# Patient Record
Sex: MALE | Race: WHITE
[De-identification: names, ages, dates, MRNs, and addresses within clinical notes are randomized per-mention and may not be internally consistent; named-entity substitution may affect disease eponyms.]

---

## 2022-02-23 ENCOUNTER — APPOINTMENT (OUTPATIENT)
Dept: ORTHOPEDIC SURGERY | Facility: CLINIC | Age: 32
End: 2022-02-23
Payer: COMMERCIAL

## 2022-02-23 VITALS
WEIGHT: 170 LBS | DIASTOLIC BLOOD PRESSURE: 64 MMHG | HEART RATE: 70 BPM | HEIGHT: 71 IN | SYSTOLIC BLOOD PRESSURE: 110 MMHG | BODY MASS INDEX: 23.8 KG/M2

## 2022-02-23 DIAGNOSIS — M22.2X1 PATELLOFEMORAL DISORDERS, RIGHT KNEE: ICD-10-CM

## 2022-02-23 DIAGNOSIS — M25.569 PAIN IN UNSPECIFIED KNEE: ICD-10-CM

## 2022-02-23 DIAGNOSIS — M22.2X2 PATELLOFEMORAL DISORDERS, RIGHT KNEE: ICD-10-CM

## 2022-02-23 PROBLEM — Z00.00 ENCOUNTER FOR PREVENTIVE HEALTH EXAMINATION: Status: ACTIVE | Noted: 2022-02-23

## 2022-02-23 PROCEDURE — 73562 X-RAY EXAM OF KNEE 3: CPT | Mod: 50

## 2022-02-23 PROCEDURE — 99203 OFFICE O/P NEW LOW 30 MIN: CPT

## 2022-02-23 RX ORDER — MELOXICAM 15 MG/1
15 TABLET ORAL
Qty: 30 | Refills: 0 | Status: ACTIVE | COMMUNITY
Start: 2022-02-23 | End: 1900-01-01

## 2022-02-23 NOTE — DISCUSSION/SUMMARY
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  Recommended conservative treatment at this time.  Is no concerning signs on exam and no concerning signs on his history.  I given prescription for meloxicam 15 mg daily as needed for pain.  Have also recommended physical therapy for anterior knee pain.  All questions were asked and answered.  He will follow up with me in an as-needed basis.

## 2022-02-23 NOTE — CONSULT LETTER
[FreeTextEntry2] : Unable to Collect PCP\par  [FreeTextEntry3] : Yobany Navarro MD\par Orthopaedic Surgery\par Primary/Revision Hip & Knee Orthoplasty\par [facility] [place]\par \par [facility] \par [address] \par [address] \[place] [address]\par \par Tel [phone]\par Fax [phone]\par \par For same day and next day orthopedic appointments contact:\par [email or web address] |[phone] Appointments available nights and weekends!  \par \par [facility] [place]\par Visit us at [facility]/orthopaedic\par

## 2022-02-23 NOTE — REVIEW OF SYSTEMS
[Joint Pain] : joint pain [Joint Swelling] : joint swelling [Negative] : Heme/Lymph [FreeTextEntry9] : left knee

## 2022-02-23 NOTE — PHYSICAL EXAM
[de-identified] : GENERAL APPEARANCE: Well nourished and hydrated, pleasant, alert, and oriented x 3. Appears their stated age. \par HEENT: Normocephalic, extraocular eye motion intact. Nasal septum midline. Oral cavity clear. External auditory canal clear. \par RESPIRATORY: Breath sounds clear and audible in all lobes. No wheezing, No accessory muscle use.\par CARDIOVASCULAR: No apparent abnormalities. No lower leg edema. No varicosities. Pedal pulses are palpable.\par NEUROLOGIC: Sensation is normal, no muscle weakness in the upper or lower extremities.\par DERMATOLOGIC: No apparent skin lesions, moist, warm, no rash.\par SPINE: Cervical spine appears normal and moves freely; thoracic spine appears normal and moves freely; lumbosacral spine appears normal and moves freely, normal, nontender.\par MUSCULOSKELETAL: Hands, wrists, and elbows are normal and move freely, shoulders are normal and move freely. \par Psychiatric: Oriented to person, place, and time, insight and judgement were intact and the affect was normal. \par Musculoskeletal:. Right knee exam shows no effusion, ROM is 0-1 35 degrees, no instability, no pain with Nory, no joint line tenderness.  Negative patellar grind\par 5/5 motor strength in bilateral lower extremities. Sensory: Intact in bilateral lower extremities. DTRs: Biceps, brachioradialis, triceps, patellar, ankle and plantar 2+ and symmetric bilaterally. Pulses: dorsalis pedis, posterior tibial, femoral, popliteal, and radial 2+ and symmetric bilaterally. \par Constitutional: Alert and in no acute distress, but well-appearing. \par Musculoskeletal:. Left knee exam shows no effusion, ROM is 0-1 35 degrees, 5 no instability, no pain with Nory, no joint line tenderness.  Negative patellar grind\par 5/5 motor strength in bilateral lower extremities. Sensory: Intact in bilateral lower extremities. DTRs: Biceps, brachioradialis, triceps, patellar, ankle and plantar 2+ and symmetric bilaterally. Pulses: dorsalis pedis, posterior tibial, femoral, popliteal, and radial 2+ and symmetric bilaterally. \par Constitutional: Alert and in no acute distress, but well-appearing.  [de-identified] : 3 views of bilateral knees obtained the office today show no acute fracture or dislocation.  No significant degenerative changes noted.

## 2022-02-23 NOTE — HISTORY OF PRESENT ILLNESS
[Pain Location] : pain [de-identified] : Patient is a 31 year old male who presents c/o left knee pain.  patient states when he runs or squats he feels clicking and weakness.  patient states has had for many years patient states pain is mild. patient does not take anything for pain.  patient does not use a brace. no locking or buckling.  patient states is running more and is noticing more so would like to look. patient states has pain to medial lower tibia.